# Patient Record
Sex: FEMALE | Race: WHITE | URBAN - METROPOLITAN AREA
[De-identification: names, ages, dates, MRNs, and addresses within clinical notes are randomized per-mention and may not be internally consistent; named-entity substitution may affect disease eponyms.]

---

## 2017-01-01 ENCOUNTER — TELEPHONE (OUTPATIENT)
Dept: FAMILY MEDICINE | Facility: CLINIC | Age: 82
End: 2017-01-01

## 2017-01-01 ENCOUNTER — CARE COORDINATION (OUTPATIENT)
Dept: CARE COORDINATION | Facility: CLINIC | Age: 82
End: 2017-01-01

## 2017-01-01 DIAGNOSIS — Z76.89 HEALTH CARE HOME: Primary | ICD-10-CM

## 2017-01-01 DIAGNOSIS — F33.1 MAJOR DEPRESSIVE DISORDER, RECURRENT EPISODE, MODERATE (H): Primary | ICD-10-CM

## 2017-01-03 NOTE — TELEPHONE ENCOUNTER
Routing refill request to provider for review/approval because:  PHQ-9 > 4.  Lizette Guerrier RN

## 2017-01-03 NOTE — TELEPHONE ENCOUNTER
Trish with [facility] calling to inform PCP that patient has signed onto hospice care as of 12/30/16 with dx of protein/calorie malnutrition.      FYI to PCP    Shekhar Fisher RN, BSN

## 2017-01-03 NOTE — TELEPHONE ENCOUNTER
sertraline (ZOLOFT) 50 MG tablet     Last Written Prescription Date: 7/27/16  Last Fill Quantity: 90, # refills: 1  Last Office Visit with [facility] primary care provider: 9/6/16  Pt is on [facility]       Last PHQ-9 score on record=   PHQ-9 SCORE 8/3/2015   Total Score 7         GERRY WigginsT

## 2017-01-13 ENCOUNTER — TELEPHONE (OUTPATIENT)
Dept: FAMILY MEDICINE | Facility: CLINIC | Age: 82
End: 2017-01-13

## 2017-01-13 NOTE — TELEPHONE ENCOUNTER
[facility] RN calling.     Pt. Passed away at 11:10pm. Gracefully at her group home.    Questions may call SHALINI Arango at [phone]    Karen Hamilton RN, BSN, PHN

## 2017-01-16 NOTE — TELEPHONE ENCOUNTER
FYI- [employer] calling pt. Has death certificate online - [facility]-website to be filled out.     Karen Hamilton, RN, BSN, PHN

## 2017-01-16 NOTE — TELEPHONE ENCOUNTER
I'm not sure what this means - do I need to do something with this?  If so, what?    Herberth Ahmadi MD

## 2017-01-16 NOTE — TELEPHONE ENCOUNTER
Dr. Ahmadi. Emilee stated you will be sent a link in your Email to sign the pt. Death certificate.    Karen Hamilton RN, BSN, PHN

## 2017-01-18 NOTE — TELEPHONE ENCOUNTER
Dr. Ahmadi,   [employer] called asking for you to complete death certificate online, it should have been emailed to you. They need it to be completed so they can move forward with the cremation no later than tomorrow for the upcomming .    [employer] Number: [phone]    Mahendra Mathis

## 2023-10-25 NOTE — PROGRESS NOTES
Clinic Care Coordination Contact    Chart review for care coordination update    Per chart review patient has transitioned from home care to [facility] 12/30/16    Care Coordination closed     Jazzy Juarez Care Coordinator RN  [facility]  [phone]       Placed copy bin in nurse station over [facility].